# Patient Record
Sex: MALE | Race: WHITE
[De-identification: names, ages, dates, MRNs, and addresses within clinical notes are randomized per-mention and may not be internally consistent; named-entity substitution may affect disease eponyms.]

---

## 2017-06-02 ENCOUNTER — HOSPITAL ENCOUNTER (OUTPATIENT)
Dept: HOSPITAL 61 - PCVCCLINIC | Age: 72
Discharge: HOME | End: 2017-06-02
Attending: INTERNAL MEDICINE
Payer: MEDICARE

## 2017-06-02 DIAGNOSIS — I11.0: Primary | ICD-10-CM

## 2017-06-02 DIAGNOSIS — Z87.891: ICD-10-CM

## 2017-06-02 DIAGNOSIS — J44.9: ICD-10-CM

## 2017-06-02 DIAGNOSIS — I50.22: ICD-10-CM

## 2017-06-02 DIAGNOSIS — I42.9: ICD-10-CM

## 2017-06-02 DIAGNOSIS — Z79.899: ICD-10-CM

## 2017-06-02 DIAGNOSIS — E78.5: ICD-10-CM

## 2017-06-02 PROCEDURE — G0463 HOSPITAL OUTPT CLINIC VISIT: HCPCS

## 2017-06-02 PROCEDURE — 93005 ELECTROCARDIOGRAM TRACING: CPT

## 2017-06-02 PROCEDURE — 80061 LIPID PANEL: CPT

## 2017-09-05 ENCOUNTER — HOSPITAL ENCOUNTER (OUTPATIENT)
Dept: HOSPITAL 61 - PCVCIMAG | Age: 72
Discharge: HOME | End: 2017-09-05
Attending: INTERNAL MEDICINE
Payer: MEDICARE

## 2017-09-05 DIAGNOSIS — I08.0: Primary | ICD-10-CM

## 2017-09-05 DIAGNOSIS — I44.7: ICD-10-CM

## 2017-09-05 DIAGNOSIS — E78.5: ICD-10-CM

## 2017-09-05 DIAGNOSIS — J43.1: ICD-10-CM

## 2017-09-05 DIAGNOSIS — I11.0: ICD-10-CM

## 2017-09-05 DIAGNOSIS — I42.9: ICD-10-CM

## 2017-09-05 DIAGNOSIS — I25.10: ICD-10-CM

## 2017-09-05 DIAGNOSIS — Z87.891: ICD-10-CM

## 2017-09-05 DIAGNOSIS — I50.22: ICD-10-CM

## 2017-09-05 DIAGNOSIS — M19.90: ICD-10-CM

## 2017-09-05 DIAGNOSIS — I65.23: ICD-10-CM

## 2017-09-05 PROCEDURE — G0463 HOSPITAL OUTPT CLINIC VISIT: HCPCS

## 2017-09-05 PROCEDURE — 93005 ELECTROCARDIOGRAM TRACING: CPT

## 2017-09-05 PROCEDURE — 93306 TTE W/DOPPLER COMPLETE: CPT

## 2017-09-05 NOTE — PCVCIMAG
--------------- APPROVED REPORT --------------





Study performed:  2017 14:09:27



EXAM: Comprehensive 2D, Doppler, and color-flow 

Echocardiogram

Patient Location: Echo lab

Status:  routine



BSA:         2.00

HR: 67 bpmBP:          114/80 mmHg

Rhythm: NSR



Other Information 

Study Quality: Adequate



2D Dimensions

LVEF(%):  24.06 (&gt;50%)

IVSd:  12.71 (7-11mm)LVOT Diam:  21.30 (18-24mm) 

LVDd:  61.91 mm

PWd:  13.64 (7-11mm)Ascending Ao:  32.36 (22-36mm)

LVDs:  54.91 (25-40mm)

Left Atrium:  54.71 (27-40mm)

Aortic Root:  32.11 mm

LV Single Plane 4CH:  25.78 %

LV Single Plane 2CH:  28.52 %Funk's LVEF:  27.15 %

Biplane EF:  27.5 %



Volumes

Left Atrial Volume (Systole)

Single Plane 4CH:  112.83 mLSingle Plane 2CH:  67.91 mL



Aortic Valve

AoV Peak Randall.:  1.23 m/s

AO Peak Gr.:  6.03 mmHgLVOT Max P.47 mmHg

LVOT Max V:  0.61 m/s

MARCELLUS Vmax: 1.76 cm2



Mitral Valve

E/A Ratio:  0.0

MV E Max Randall.:  0.71 m/s

MV A Randall.:  0.00 m/s

IVRT:  169.55 ms



Left Ventricle

Left ventricle is dilated. There is global hypokinesis of the left 

ventricle. There is normal left ventricular wall thickness. Left 

ventricular ejection fraction is severely decreased. LVEF is 25%. 

Grade II diastolic dysfunction



Right Ventricle

The right ventricle is normal size. The right ventricular systolic 

function is normal.



Atria

The left atrium size is enlarged The right atrium size is 

normal.



Aortic Valve

The aortic valve is mildly calcified Mild aortic regurgitation. There 

is no aortic valvular stenosis.



Mitral Valve

Moderate mitral annular calcification Mild mitral regurgitation. No 

evidence of mitral valve stenosis.



Tricuspid Valve

The tricuspid valve is normal in structure. There is no tricuspid 

valve regurgitation noted.



Pulmonic Valve

The pulmonary valve is normal in structure. There is no pulmonic 

valvular regurgitation.



Great Vessels

The aortic root is normal in size. IVC is normal in size and 

collapses with &gt;50% inspiration



Pericardium

There is no pericardial effusion.



&lt;Conclusion&gt;

Left ventricular ejection fraction is severely decreased.

There is global hypokinesis of the left ventricle. EF 25%

Grade II diastolic dysfunction

The aortic valve is mildly calcified.  Mild aortic regurgitation, no 

stenosis.

Moderate mitral annular calcification.  Mild mitral 

regurgitation.

Pulmonary artery pressure could not be reliably ascertained

There is no pericardial effusion.

## 2017-12-08 ENCOUNTER — HOSPITAL ENCOUNTER (OUTPATIENT)
Dept: HOSPITAL 61 - PCVCCLINIC | Age: 72
Discharge: HOME | End: 2017-12-08
Attending: INTERNAL MEDICINE
Payer: MEDICARE

## 2017-12-08 DIAGNOSIS — I42.9: Primary | ICD-10-CM

## 2017-12-08 DIAGNOSIS — I11.0: ICD-10-CM

## 2017-12-08 DIAGNOSIS — I50.22: ICD-10-CM

## 2017-12-08 DIAGNOSIS — I48.0: ICD-10-CM

## 2017-12-08 DIAGNOSIS — E78.5: ICD-10-CM

## 2017-12-08 DIAGNOSIS — I65.23: ICD-10-CM

## 2017-12-08 DIAGNOSIS — R94.31: ICD-10-CM

## 2017-12-08 DIAGNOSIS — Z79.899: ICD-10-CM

## 2017-12-08 DIAGNOSIS — Z87.891: ICD-10-CM

## 2017-12-08 PROCEDURE — G0463 HOSPITAL OUTPT CLINIC VISIT: HCPCS

## 2017-12-08 PROCEDURE — 80061 LIPID PANEL: CPT

## 2017-12-08 PROCEDURE — 93005 ELECTROCARDIOGRAM TRACING: CPT

## 2018-03-09 ENCOUNTER — HOSPITAL ENCOUNTER (OUTPATIENT)
Dept: HOSPITAL 61 - PCVCIMAG | Age: 73
Discharge: HOME | End: 2018-03-09
Attending: INTERNAL MEDICINE
Payer: MEDICARE

## 2018-03-09 DIAGNOSIS — I08.3: ICD-10-CM

## 2018-03-09 DIAGNOSIS — Z95.5: ICD-10-CM

## 2018-03-09 DIAGNOSIS — Z79.899: ICD-10-CM

## 2018-03-09 DIAGNOSIS — E78.5: ICD-10-CM

## 2018-03-09 DIAGNOSIS — Z87.891: ICD-10-CM

## 2018-03-09 DIAGNOSIS — I65.23: ICD-10-CM

## 2018-03-09 DIAGNOSIS — I44.7: ICD-10-CM

## 2018-03-09 DIAGNOSIS — I48.0: ICD-10-CM

## 2018-03-09 DIAGNOSIS — J43.1: ICD-10-CM

## 2018-03-09 DIAGNOSIS — I42.8: ICD-10-CM

## 2018-03-09 DIAGNOSIS — I11.0: Primary | ICD-10-CM

## 2018-03-09 DIAGNOSIS — I50.42: ICD-10-CM

## 2018-03-09 PROCEDURE — 93005 ELECTROCARDIOGRAM TRACING: CPT

## 2018-03-09 PROCEDURE — 80061 LIPID PANEL: CPT

## 2018-03-09 PROCEDURE — 93306 TTE W/DOPPLER COMPLETE: CPT
